# Patient Record
Sex: MALE | URBAN - METROPOLITAN AREA
[De-identification: names, ages, dates, MRNs, and addresses within clinical notes are randomized per-mention and may not be internally consistent; named-entity substitution may affect disease eponyms.]

---

## 2022-05-21 ENCOUNTER — OFFICE VISIT (OUTPATIENT)
Facility: LOCATION | Age: 67
End: 2022-05-21
Payer: COMMERCIAL

## 2022-05-21 DIAGNOSIS — H25.813 COMBINED FORMS OF AGE-RELATED CATARACT, BILATERAL: Primary | ICD-10-CM

## 2022-05-21 DIAGNOSIS — H52.4 PRESBYOPIA: ICD-10-CM

## 2022-05-21 DIAGNOSIS — H35.62 RETINAL HEMORRHAGE, LEFT EYE: ICD-10-CM

## 2022-05-21 DIAGNOSIS — H43.811 VITREOUS DEGENERATION, RIGHT EYE: ICD-10-CM

## 2022-05-21 PROCEDURE — 92004 COMPRE OPH EXAM NEW PT 1/>: CPT | Performed by: OPTOMETRIST

## 2022-05-21 ASSESSMENT — VISUAL ACUITY
OS: 20/20
OD: 20/20

## 2022-05-21 ASSESSMENT — INTRAOCULAR PRESSURE
OD: 23
OS: 23

## 2022-05-21 ASSESSMENT — KERATOMETRY
OS: 43.88
OD: 44.38

## 2022-05-21 NOTE — IMPRESSION/PLAN
Impression: Retinal hemorrhage, left eye: H35.62. Plan: Single peripheral blot heme temporally OS. Patient denies pain, diabetes, or uncontrolled hypertension. Will monitor.

## 2022-05-21 NOTE — IMPRESSION/PLAN
Impression: Combined forms of age-related cataract, bilateral: H25.813. Plan: Educated patient on exam findings and expectations with cataract progression. Cautioned patient about glare with nighttime driving. Extraction not indicated at this time. Monitor. 

RTC 1 year for Comprehensive Eye Exam with Dr. Sara Powers